# Patient Record
Sex: FEMALE | Race: WHITE
[De-identification: names, ages, dates, MRNs, and addresses within clinical notes are randomized per-mention and may not be internally consistent; named-entity substitution may affect disease eponyms.]

---

## 2019-11-25 ENCOUNTER — HOSPITAL ENCOUNTER (OUTPATIENT)
Dept: HOSPITAL 8 - LDOP | Age: 34
Discharge: HOME | End: 2019-11-25
Attending: OBSTETRICS & GYNECOLOGY
Payer: COMMERCIAL

## 2019-11-25 DIAGNOSIS — Z3A.28: ICD-10-CM

## 2019-11-25 DIAGNOSIS — R10.2: ICD-10-CM

## 2019-11-25 DIAGNOSIS — O26.893: Primary | ICD-10-CM

## 2019-11-25 LAB — MICROSCOPIC: (no result)

## 2019-11-25 PROCEDURE — G0463 HOSPITAL OUTPT CLINIC VISIT: HCPCS

## 2019-11-25 PROCEDURE — 99211 OFF/OP EST MAY X REQ PHY/QHP: CPT

## 2019-11-25 PROCEDURE — 59025 FETAL NON-STRESS TEST: CPT

## 2019-11-25 PROCEDURE — 87086 URINE CULTURE/COLONY COUNT: CPT

## 2019-11-25 PROCEDURE — 81003 URINALYSIS AUTO W/O SCOPE: CPT

## 2019-12-29 ENCOUNTER — HOSPITAL ENCOUNTER (OUTPATIENT)
Dept: HOSPITAL 8 - LDOP | Age: 34
Discharge: HOME | End: 2019-12-29
Attending: OBSTETRICS & GYNECOLOGY
Payer: COMMERCIAL

## 2019-12-29 VITALS — WEIGHT: 137.35 LBS | HEIGHT: 63 IN | BODY MASS INDEX: 24.34 KG/M2

## 2019-12-29 VITALS — DIASTOLIC BLOOD PRESSURE: 63 MMHG | SYSTOLIC BLOOD PRESSURE: 118 MMHG

## 2019-12-29 DIAGNOSIS — O09.513: ICD-10-CM

## 2019-12-29 DIAGNOSIS — Z3A.33: ICD-10-CM

## 2019-12-29 DIAGNOSIS — R10.9: ICD-10-CM

## 2019-12-29 DIAGNOSIS — O26.893: Primary | ICD-10-CM

## 2019-12-29 PROCEDURE — 81001 URINALYSIS AUTO W/SCOPE: CPT

## 2019-12-29 PROCEDURE — 87086 URINE CULTURE/COLONY COUNT: CPT

## 2019-12-29 PROCEDURE — 99211 OFF/OP EST MAY X REQ PHY/QHP: CPT

## 2019-12-29 PROCEDURE — 59025 FETAL NON-STRESS TEST: CPT

## 2019-12-29 PROCEDURE — G0463 HOSPITAL OUTPT CLINIC VISIT: HCPCS

## 2019-12-29 PROCEDURE — 76817 TRANSVAGINAL US OBSTETRIC: CPT

## 2019-12-30 LAB — MICROSCOPIC: (no result)

## 2020-11-18 ENCOUNTER — HOSPITAL ENCOUNTER (OUTPATIENT)
Dept: LAB | Facility: MEDICAL CENTER | Age: 35
End: 2020-11-18
Attending: ORTHOPAEDIC SURGERY
Payer: COMMERCIAL

## 2020-11-18 LAB
BASOPHILS # BLD AUTO: 0.5 % (ref 0–1.8)
BASOPHILS # BLD: 0.03 K/UL (ref 0–0.12)
CRP SERPL HS-MCNC: 0.04 MG/DL (ref 0–0.75)
EOSINOPHIL # BLD AUTO: 0.11 K/UL (ref 0–0.51)
EOSINOPHIL NFR BLD: 1.8 % (ref 0–6.9)
ERYTHROCYTE [DISTWIDTH] IN BLOOD BY AUTOMATED COUNT: 39.3 FL (ref 35.9–50)
HCT VFR BLD AUTO: 42.8 % (ref 37–47)
HGB BLD-MCNC: 15.2 G/DL (ref 12–16)
IMM GRANULOCYTES # BLD AUTO: 0.01 K/UL (ref 0–0.11)
IMM GRANULOCYTES NFR BLD AUTO: 0.2 % (ref 0–0.9)
LYMPHOCYTES # BLD AUTO: 2.75 K/UL (ref 1–4.8)
LYMPHOCYTES NFR BLD: 44.4 % (ref 22–41)
MCH RBC QN AUTO: 32.3 PG (ref 27–33)
MCHC RBC AUTO-ENTMCNC: 35.5 G/DL (ref 33.6–35)
MCV RBC AUTO: 90.9 FL (ref 81.4–97.8)
MONOCYTES # BLD AUTO: 0.44 K/UL (ref 0–0.85)
MONOCYTES NFR BLD AUTO: 7.1 % (ref 0–13.4)
NEUTROPHILS # BLD AUTO: 2.85 K/UL (ref 2–7.15)
NEUTROPHILS NFR BLD: 46 % (ref 44–72)
NRBC # BLD AUTO: 0 K/UL
NRBC BLD-RTO: 0 /100 WBC
PLATELET # BLD AUTO: 275 K/UL (ref 164–446)
PMV BLD AUTO: 9.5 FL (ref 9–12.9)
RBC # BLD AUTO: 4.71 M/UL (ref 4.2–5.4)
RHEUMATOID FACT SER IA-ACNC: <10 IU/ML (ref 0–14)
URATE SERPL-MCNC: 5.8 MG/DL (ref 1.9–8.2)
WBC # BLD AUTO: 6.2 K/UL (ref 4.8–10.8)

## 2020-11-18 PROCEDURE — 84550 ASSAY OF BLOOD/URIC ACID: CPT

## 2020-11-18 PROCEDURE — 86038 ANTINUCLEAR ANTIBODIES: CPT

## 2020-11-18 PROCEDURE — 86200 CCP ANTIBODY: CPT

## 2020-11-18 PROCEDURE — 36415 COLL VENOUS BLD VENIPUNCTURE: CPT

## 2020-11-18 PROCEDURE — 86140 C-REACTIVE PROTEIN: CPT

## 2020-11-18 PROCEDURE — 86431 RHEUMATOID FACTOR QUANT: CPT

## 2020-11-18 PROCEDURE — 85025 COMPLETE CBC W/AUTO DIFF WBC: CPT

## 2020-11-20 LAB
CCP IGG SERPL-ACNC: 4 UNITS (ref 0–19)
NUCLEAR IGG SER QL IA: NORMAL

## 2021-08-11 ENCOUNTER — TELEPHONE (OUTPATIENT)
Dept: SCHEDULING | Facility: IMAGING CENTER | Age: 36
End: 2021-08-11

## 2023-09-01 ENCOUNTER — HOSPITAL ENCOUNTER (EMERGENCY)
Facility: MEDICAL CENTER | Age: 38
End: 2023-09-01
Attending: EMERGENCY MEDICINE
Payer: COMMERCIAL

## 2023-09-01 ENCOUNTER — APPOINTMENT (OUTPATIENT)
Dept: RADIOLOGY | Facility: MEDICAL CENTER | Age: 38
End: 2023-09-01
Attending: EMERGENCY MEDICINE
Payer: COMMERCIAL

## 2023-09-01 VITALS
WEIGHT: 125.88 LBS | SYSTOLIC BLOOD PRESSURE: 106 MMHG | OXYGEN SATURATION: 97 % | HEART RATE: 66 BPM | BODY MASS INDEX: 22.3 KG/M2 | HEIGHT: 63 IN | RESPIRATION RATE: 16 BRPM | TEMPERATURE: 98 F | DIASTOLIC BLOOD PRESSURE: 69 MMHG

## 2023-09-01 DIAGNOSIS — G51.0 BELL'S PALSY: ICD-10-CM

## 2023-09-01 DIAGNOSIS — M79.2 RADICULAR PAIN IN RIGHT ARM: ICD-10-CM

## 2023-09-01 LAB
ALBUMIN SERPL BCP-MCNC: 4.7 G/DL (ref 3.2–4.9)
ALBUMIN/GLOB SERPL: 1.6 G/DL
ALP SERPL-CCNC: 60 U/L (ref 30–99)
ALT SERPL-CCNC: 13 U/L (ref 2–50)
ANION GAP SERPL CALC-SCNC: 14 MMOL/L (ref 7–16)
AST SERPL-CCNC: 16 U/L (ref 12–45)
BASOPHILS # BLD AUTO: 0.2 % (ref 0–1.8)
BASOPHILS # BLD: 0.01 K/UL (ref 0–0.12)
BILIRUB SERPL-MCNC: 0.4 MG/DL (ref 0.1–1.5)
BUN SERPL-MCNC: 10 MG/DL (ref 8–22)
CALCIUM ALBUM COR SERPL-MCNC: 8.6 MG/DL (ref 8.5–10.5)
CALCIUM SERPL-MCNC: 9.2 MG/DL (ref 8.4–10.2)
CHLORIDE SERPL-SCNC: 103 MMOL/L (ref 96–112)
CO2 SERPL-SCNC: 19 MMOL/L (ref 20–33)
CREAT SERPL-MCNC: 0.67 MG/DL (ref 0.5–1.4)
CRP SERPL HS-MCNC: <0.3 MG/DL (ref 0–0.75)
EKG IMPRESSION: NORMAL
EOSINOPHIL # BLD AUTO: 0.03 K/UL (ref 0–0.51)
EOSINOPHIL NFR BLD: 0.5 % (ref 0–6.9)
ERYTHROCYTE [DISTWIDTH] IN BLOOD BY AUTOMATED COUNT: 40.1 FL (ref 35.9–50)
ERYTHROCYTE [SEDIMENTATION RATE] IN BLOOD BY WESTERGREN METHOD: 3 MM/HOUR (ref 0–25)
GFR SERPLBLD CREATININE-BSD FMLA CKD-EPI: 113 ML/MIN/1.73 M 2
GLOBULIN SER CALC-MCNC: 2.9 G/DL (ref 1.9–3.5)
GLUCOSE SERPL-MCNC: 101 MG/DL (ref 65–99)
HCG SERPL QL: NEGATIVE
HCT VFR BLD AUTO: 41.9 % (ref 37–47)
HGB BLD-MCNC: 15 G/DL (ref 12–16)
IMM GRANULOCYTES # BLD AUTO: 0.02 K/UL (ref 0–0.11)
IMM GRANULOCYTES NFR BLD AUTO: 0.3 % (ref 0–0.9)
LYMPHOCYTES # BLD AUTO: 2.18 K/UL (ref 1–4.8)
LYMPHOCYTES NFR BLD: 33.4 % (ref 22–41)
MCH RBC QN AUTO: 32.4 PG (ref 27–33)
MCHC RBC AUTO-ENTMCNC: 35.8 G/DL (ref 32.2–35.5)
MCV RBC AUTO: 90.5 FL (ref 81.4–97.8)
MONOCYTES # BLD AUTO: 0.56 K/UL (ref 0–0.85)
MONOCYTES NFR BLD AUTO: 8.6 % (ref 0–13.4)
NEUTROPHILS # BLD AUTO: 3.72 K/UL (ref 1.82–7.42)
NEUTROPHILS NFR BLD: 57 % (ref 44–72)
NRBC # BLD AUTO: 0 K/UL
NRBC BLD-RTO: 0 /100 WBC (ref 0–0.2)
PLATELET # BLD AUTO: 292 K/UL (ref 164–446)
PMV BLD AUTO: 9.6 FL (ref 9–12.9)
POTASSIUM SERPL-SCNC: 3.4 MMOL/L (ref 3.6–5.5)
PROT SERPL-MCNC: 7.6 G/DL (ref 6–8.2)
RBC # BLD AUTO: 4.63 M/UL (ref 4.2–5.4)
SODIUM SERPL-SCNC: 136 MMOL/L (ref 135–145)
WBC # BLD AUTO: 6.5 K/UL (ref 4.8–10.8)

## 2023-09-01 PROCEDURE — 99285 EMERGENCY DEPT VISIT HI MDM: CPT

## 2023-09-01 PROCEDURE — 93005 ELECTROCARDIOGRAM TRACING: CPT | Performed by: EMERGENCY MEDICINE

## 2023-09-01 PROCEDURE — 85652 RBC SED RATE AUTOMATED: CPT

## 2023-09-01 PROCEDURE — 36415 COLL VENOUS BLD VENIPUNCTURE: CPT

## 2023-09-01 PROCEDURE — 86140 C-REACTIVE PROTEIN: CPT

## 2023-09-01 PROCEDURE — 84703 CHORIONIC GONADOTROPIN ASSAY: CPT

## 2023-09-01 PROCEDURE — 80053 COMPREHEN METABOLIC PANEL: CPT

## 2023-09-01 PROCEDURE — 70551 MRI BRAIN STEM W/O DYE: CPT

## 2023-09-01 PROCEDURE — 85025 COMPLETE CBC W/AUTO DIFF WBC: CPT

## 2023-09-01 RX ORDER — OMEGA-3 FATTY ACIDS/FISH OIL 300-1000MG
400 CAPSULE ORAL EVERY 6 HOURS PRN
COMMUNITY

## 2023-09-01 RX ORDER — PREDNISONE 10 MG/1
10 TABLET ORAL DAILY
Qty: 7 TABLET | Refills: 0 | Status: SHIPPED | OUTPATIENT
Start: 2023-09-01 | End: 2023-09-08

## 2023-09-01 NOTE — ED NOTES
Pharmacy Medication Reconciliation      ~Medication reconciliation updated and complete per patient at bedside   ~Allergies have been verified   ~No oral ABX within the last 30 days  ~[facility] home [facility] :  [facility]

## 2023-09-01 NOTE — ED TRIAGE NOTES
"Chief Complaint   Patient presents with    Paralysis     Right side   Started yesterday in her right Eye but has progressed into her mouth and arm   Pt reports that her right arm feels heavy and that her tongue feels numb. Pts tongue deviates to the right side when asked to stick it out and lack of movement when asked to smile     BP (!) 146/101   Pulse 77   Temp 36.9 °C (98.5 °F) (Temporal)   Resp 16   Ht 1.6 m (5' 3\")   Wt 57.1 kg (125 lb 14.1 oz)   SpO2 98%   BMI 22.30 kg/m²     Pt states that she about a week ago had pain in the right side of her neck.   "

## 2023-09-01 NOTE — ED PROVIDER NOTES
"ED Provider Note  CHIEF COMPLAINT  Chief Complaint   Patient presents with    Possible Stroke     Right side   Started yesterday in her right Eye but has progressed into her mouth and arm   Pt reports that her right arm feels heavy and that her tongue feels numb. Pts tongue deviates to the right side when asked to stick it out and lack of movement when asked to smile       HPI  Shweta Gallardo is a 37 y.o. female who presents for evaluation of muscle weakness to the side of the face, including forehead as well as right arm \"heaviness.\"  Patient notes the symptoms started around her right eye yesterday and she noted some blurriness when waking in the morning.  She felt irritation in the eye and throughout the day noted that her lid was lagging and, subsequently, she noted the right side of her forehead and mouth was not moving appropriately when she looked in the mirror.  She states this has progressed throughout the day but is still able to move the right side of her face to a degree.  She notes that she had a deep tissue massage of the right upper extremity and posterior shoulder region several days ago and was very sore for the next 3 days.  Her arm is been feeling heavy since but she still has been able to use it.  She notes some sensory changes to the right side of her mouth and tongue as well.  She has no medical history in terms of vascular pathology or cardiac issues.  There is family history of stroke and diabetes as well as hypertension and hyperlipidemia.  Patient has never smoked and only drinks occasionally.  Patient also notes incidentally that a few days ago she felt she got bitten by something on the right upper neck and for a few days she had a small bump which she thought was a lymph node.  She is unclear if this is related in any way.  EXTERNAL RECORDS REVIEWED  Reviewed last office visit in November 2022 for unspecified chest pain.  ROS  Constitutional: No fevers or chills  Skin: No " "rashes  HEENT: No sore throat, runny nose, double vision, or current blurry vision.  Recent blurry vision yesterday morning.  Right-sided facial muscle weakness.  Neck: No neck pain  Chest: No pain or rashes  Pulm: No shortness of breath, cough, wheezing, stridor, or pain with inspiration/expiration  Gastrointestinal: No nausea, vomiting, diarrhea, constipation, bloating, melena, hematochezia or abdominal pain.  Genitourinary: No dysuria or hematuria  Musculoskeletal: Upper back pain near the trapezius.  No swelling.  No focal weakness to muscle extremities but the right arm feels \"heavy.\"  Neurologic: Water and sensory changes as noted above, especially in the face and right upper extremity.. No confusion or disorientation.  Heme: No bleeding or bruising problems.   Immuno: No hx of recurrent infections        LIMITATION TO HISTORY   None   OUTSIDE HISTORIAN(S):  None        PAST FAM HISTORY  History reviewed. No pertinent family history.    PAST MEDICAL HISTORY   None    SOCIAL HISTORY  Social History     Tobacco Use    Smoking status: Never    Smokeless tobacco: Never   Vaping Use    Vaping Use: Some days    Substances: Nicotine   Substance and Sexual Activity    Alcohol use: Yes     Alcohol/week: 4.2 oz     Types: 7 Glasses of wine per week    Drug use: No    Sexual activity: Not on file       SURGICAL HISTORY   has a past surgical history that includes appendectomy laparoscopic (1/21/2015).    CURRENT MEDICATIONS  Home Medications       Reviewed by Reyna Gomez (Pharmacy Tech) on 09/01/23 at 1411  Med List Status: Complete     Medication Last Dose Status   Ibuprofen 200 MG Cap 8/31/2023 Active                     ALLERGIES  No Known Allergies    PHYSICAL EXAM  VITAL SIGNS: /69   Pulse 66   Temp 36.7 °C (98 °F)   Resp 16   Ht 1.6 m (5' 3\")   Wt 57.1 kg (125 lb 14.1 oz)   SpO2 97%   BMI 22.30 kg/m²    Gen: Alert in no apparent distress.  HEENT: No signs of trauma, Bilateral external ears " normal, Nose normal. Conjunctiva normal, Non-icteric.  Mild facial muscle weakness including right forehead.  Patient appears to be phonating and tolerating her own secretions.  PERRLA, EOMI.  Neck: No posterior neck lesions or papules, and no neck stiffness.  Excellent range of motion actively.  No specific tenderness.  Neck otherwise supple.  No JVD.  Lymphatic: No cervical lymphadenopathy noted.   Cardiovascular: Regular rate and rhythm, no murmurs.  Capillary refill less than 3 seconds to all extremities, 2+ distal pulses.  Thorax & Lungs: Normal breath sounds, No respiratory distress, No wheezing bilateral chest rise  Abdomen: Bowel sounds normal, Soft, No tenderness, No masses, No pulsatile masses. No Guarding or rebound  Skin: Warm, Dry, No erythema, No rash noted to exposed areas.   Back: No bony tenderness, No CVA tenderness.  Mild tenderness to the right upper back in the area of the trapezius diffusely.  Extremities: Intact distal pulses, No edema  Neurologic: Alert , no facial droop, grossly normal coordination and strength to all extremities.  Sensation intact to light touch to all extremities.  Finger-to-nose normal bilaterally.  There is mild muscle weakness to the right side of the face including the forehead.  There is slight lid lag to the right eye when blinking.  Psychiatric: Affect pleasant    INITIAL IMPRESSION  Patient arrives for evaluation of what appears to be primarily a Bell's palsy issue.  Patient has overlapping symptoms in terms of her right arm heaviness which is likely related to the vigorous deep muscle massage which left her sore for 3 days.  She does not have any discernible muscle weakness to her right upper extremity versus the left and is neurologically intact.  She is able to coordinate the upper extremity as well.  I suspect that the likely bug bite she had on the back of her neck is unrelated as well.  There is no evidence for tick and I do not suspect this is related to a  Lyme issue.  Regardless, the patient is having right-sided arm and facial symptoms and I feel she can be ruled out for emergent issues such as CVA with an MRI.  Given that her symptoms started well over 14 hours ago, I do not feel angiography will benefit her  unless there is evidence for a CVA or vasculitis on the MRI.  I will perform screening labs to include sed rate and CRP for that reason as well.  Patient will watch closely for any deterioration both in terms of her neurologic status and her hemodynamic status.  She is mildly hypertensive here which is likely situational, but could be related to CVA.  Patient states understanding of the plan.  LABS  Results for orders placed or performed during the hospital encounter of 09/01/23   CBC WITH DIFFERENTIAL   Result Value Ref Range    WBC 6.5 4.8 - 10.8 K/uL    RBC 4.63 4.20 - 5.40 M/uL    Hemoglobin 15.0 12.0 - 16.0 g/dL    Hematocrit 41.9 37.0 - 47.0 %    MCV 90.5 81.4 - 97.8 fL    MCH 32.4 27.0 - 33.0 pg    MCHC 35.8 (H) 32.2 - 35.5 g/dL    RDW 40.1 35.9 - 50.0 fL    Platelet Count 292 164 - 446 K/uL    MPV 9.6 9.0 - 12.9 fL    Neutrophils-Polys 57.00 44.00 - 72.00 %    Lymphocytes 33.40 22.00 - 41.00 %    Monocytes 8.60 0.00 - 13.40 %    Eosinophils 0.50 0.00 - 6.90 %    Basophils 0.20 0.00 - 1.80 %    Immature Granulocytes 0.30 0.00 - 0.90 %    Nucleated RBC 0.00 0.00 - 0.20 /100 WBC    Neutrophils (Absolute) 3.72 1.82 - 7.42 K/uL    Lymphs (Absolute) 2.18 1.00 - 4.80 K/uL    Monos (Absolute) 0.56 0.00 - 0.85 K/uL    Eos (Absolute) 0.03 0.00 - 0.51 K/uL    Baso (Absolute) 0.01 0.00 - 0.12 K/uL    Immature Granulocytes (abs) 0.02 0.00 - 0.11 K/uL    NRBC (Absolute) 0.00 K/uL   COMP METABOLIC PANEL   Result Value Ref Range    Sodium 136 135 - 145 mmol/L    Potassium 3.4 (L) 3.6 - 5.5 mmol/L    Chloride 103 96 - 112 mmol/L    Co2 19 (L) 20 - 33 mmol/L    Anion Gap 14.0 7.0 - 16.0    Glucose 101 (H) 65 - 99 mg/dL    Bun 10 8 - 22 mg/dL     Creatinine 0.67 0.50 - 1.40 mg/dL    Calcium 9.2 8.4 - 10.2 mg/dL    Correct Calcium 8.6 8.5 - 10.5 mg/dL    AST(SGOT) 16 12 - 45 U/L    ALT(SGPT) 13 2 - 50 U/L    Alkaline Phosphatase 60 30 - 99 U/L    Total Bilirubin 0.4 0.1 - 1.5 mg/dL    Albumin 4.7 3.2 - 4.9 g/dL    Total Protein 7.6 6.0 - 8.2 g/dL    Globulin 2.9 1.9 - 3.5 g/dL    A-G Ratio 1.6 g/dL   Sed Rate   Result Value Ref Range    Sed Rate Westergren 3 0 - 25 mm/hour   CRP QUANTITIVE (NON-CARDIAC)   Result Value Ref Range    Stat C-Reactive Protein <0.30 0.00 - 0.75 mg/dL   HCG QUAL SERUM   Result Value Ref Range    Beta-Hcg Qualitative Serum Negative Negative   ESTIMATED GFR   Result Value Ref Range    GFR (CKD-EPI) 113 >60 mL/min/1.73 m 2   EKG (NOW)   Result Value Ref Range    Report       [facility] Emergency Dept.    Test Date:  2023  Pt Name:    JEFFREY LACY            Department: [facility]  MRN:        [id number]                      [facility]  Gender:     Female                       Technician: [id number]  :        [id number]                   Requested By:PEPE KHAN  Order #:    [id number]                    Reading MD:    Measurements  Intervals                                Axis  Rate:       69                           P:          61  DC:         139                          QRS:        51  QRSD:       92                           T:          25  QT:         424  QTc:        455    Interpretive Statements  Sinus rhythm  No previous ECG available for comparison       I have independently interpreted this EKG  Sinus rhythm rate of 69, intervals appear to be within normal limits, there are no ST or T wave changes to suggest ischemia, there is no ectopy.  There is no old EKG to compare.  RADIOLOGY  MR-BRAIN-W/O   Final Result      1.  No acute abnormality.   2.  Unremarkable pre and postcontrast MR examination of the brain except for a focal nonspecific T2 hyperintensity in the left parietal white  "matter likely representing nonspecific foci of gliosis.        I have independently interpreted the diagnostic imaging associated with this visit and am waiting the final reading from the radiologist.   My preliminary interpretation is a follows: Single view chest x-ray: There are no pulmonary opacities to suggest infiltrates or effusions, cardiomediastinal silhouette appears to be within normal limits.  There are no bony abnormalities.        COURSE & MEDICAL DECISION MAKING  Pertinent Labs & Imaging studies reviewed. (See chart for details)  4:25 PM  Reevaluated patient at bedside.  She is calm, conversant, has had no interval worsening of her symptoms.  She states understanding of the incidental finding on her MRI of the gliosis in a small area as well as the abnormal findings on the lab exam which are not significant.  Given the entirety of the exam, and although the sed rate is not back, I feel this is very likely to be two separate issues.  First issue is Bell's palsy which has a fairly typical presentation but does have some sensory  changes superimposed.  She is not having any significant difficulty speaking or swallowing however.  Her arm \"heaviness\" is likely from the deep tissue massage that she had.  posterior trapezius region right she apparently had a deep tissue massage to work out a kink in the but she was extremely sore for 3 days after the massage.  I do not feel she will benefit from neck or head angiography at this point and I do not feel neurology consultation or inpatient observation is necessary.  Patient will be treated empirically for Bell's palsy with prednisone.  There is no evidence for a viral illness superimposed so antivirals will not be given.  Patient will be encouraged to patch her eye at night so that she does not develop corneal ulceration from lack of lid closure.  She will follow-up with her primary care physician or return if her symptoms worsen or change in any way.      I " have discussed management of the patient with the following physicians and JESSICA's:  none    Escalation of care considered, and ultimately not performed:acute inpatient care management, however at this time, the patient is most appropriate for outpatient management, neurology consultation    Barriers to care at this time, including but not limited to: None.     Decision tools and Rx drugs considered including, but not limited to : None    Discussion of management with other QHP or appropriate source(s): none    The patient will not drink alcohol nor drive with prescribed medications. The patient will return for worsening symptoms and is stable at the time of discharge. The patient verbalizes understanding and will comply.    FINAL IMPRESSION  1. Bell's palsy    2. Radicular pain in right arm        Electronically signed by: Jayant Tabor M.D., 9/1/2023 1:15 PM

## 2023-09-02 NOTE — ED NOTES
Discharge instructions given and discussed. RX for prednisone given and pt educated. Pt educated to come back to ER for new or worsening symptoms and follow up with PCP as instructed. Pt verbalized understanding. VSS. Pt  Discharged in stable condition.

## 2023-10-14 ENCOUNTER — HOSPITAL ENCOUNTER (OUTPATIENT)
Dept: RADIOLOGY | Facility: MEDICAL CENTER | Age: 38
End: 2023-10-14
Attending: NURSE PRACTITIONER
Payer: COMMERCIAL

## 2023-10-14 DIAGNOSIS — R93.0 NONSPECIFIC ABNORMAL FINDINGS ON IMAGING EXAMINATION OF SKULL AND HEAD: ICD-10-CM

## 2023-10-14 PROCEDURE — 700117 HCHG RX CONTRAST REV CODE 255: Performed by: NURSE PRACTITIONER

## 2023-10-14 PROCEDURE — A9579 GAD-BASE MR CONTRAST NOS,1ML: HCPCS | Performed by: NURSE PRACTITIONER

## 2023-10-14 PROCEDURE — 70553 MRI BRAIN STEM W/O & W/DYE: CPT

## 2023-10-14 RX ADMIN — GADOTERIDOL 10 ML: 279.3 INJECTION, SOLUTION INTRAVENOUS at 18:44

## 2023-10-26 ENCOUNTER — APPOINTMENT (OUTPATIENT)
Dept: RADIOLOGY | Facility: MEDICAL CENTER | Age: 38
End: 2023-10-26
Attending: OBSTETRICS & GYNECOLOGY
Payer: COMMERCIAL

## 2023-10-26 DIAGNOSIS — Z12.31 VISIT FOR SCREENING MAMMOGRAM: ICD-10-CM

## 2023-10-26 PROCEDURE — 77063 BREAST TOMOSYNTHESIS BI: CPT

## 2023-11-01 ENCOUNTER — HOSPITAL ENCOUNTER (OUTPATIENT)
Dept: RADIOLOGY | Facility: MEDICAL CENTER | Age: 38
End: 2023-11-01
Attending: OBSTETRICS & GYNECOLOGY
Payer: COMMERCIAL

## 2023-11-01 DIAGNOSIS — R92.8 ABNORMAL MAMMOGRAM: ICD-10-CM

## 2023-11-01 PROCEDURE — G0279 TOMOSYNTHESIS, MAMMO: HCPCS

## 2023-11-01 PROCEDURE — 76642 ULTRASOUND BREAST LIMITED: CPT | Mod: LT

## 2024-10-15 ENCOUNTER — TELEPHONE (OUTPATIENT)
Dept: HEALTH INFORMATION MANAGEMENT | Facility: OTHER | Age: 39
End: 2024-10-15
Payer: COMMERCIAL

## 2024-11-04 ENCOUNTER — APPOINTMENT (OUTPATIENT)
Dept: RADIOLOGY | Facility: MEDICAL CENTER | Age: 39
End: 2024-11-04
Attending: FAMILY MEDICINE
Payer: COMMERCIAL

## 2024-11-04 DIAGNOSIS — Z12.31 VISIT FOR SCREENING MAMMOGRAM: ICD-10-CM

## 2024-11-04 PROCEDURE — 77067 SCR MAMMO BI INCL CAD: CPT

## 2025-01-21 ENCOUNTER — RESEARCH ENCOUNTER (OUTPATIENT)
Dept: RESEARCH | Facility: MEDICAL CENTER | Age: 40
End: 2025-01-21
Payer: COMMERCIAL

## 2025-04-27 ENCOUNTER — APPOINTMENT (OUTPATIENT)
Dept: RADIOLOGY | Facility: MEDICAL CENTER | Age: 40
End: 2025-04-27
Attending: STUDENT IN AN ORGANIZED HEALTH CARE EDUCATION/TRAINING PROGRAM

## 2025-07-07 ENCOUNTER — OFFICE VISIT (OUTPATIENT)
Dept: URGENT CARE | Facility: CLINIC | Age: 40
End: 2025-07-07
Payer: COMMERCIAL

## 2025-07-07 VITALS
SYSTOLIC BLOOD PRESSURE: 110 MMHG | RESPIRATION RATE: 16 BRPM | HEART RATE: 74 BPM | DIASTOLIC BLOOD PRESSURE: 80 MMHG | HEIGHT: 62 IN | TEMPERATURE: 99.1 F | BODY MASS INDEX: 22.82 KG/M2 | OXYGEN SATURATION: 98 % | WEIGHT: 124 LBS

## 2025-07-07 DIAGNOSIS — R07.81 RIB PAIN ON RIGHT SIDE: ICD-10-CM

## 2025-07-07 DIAGNOSIS — R07.89 COSTOCHONDRAL PAIN: Primary | ICD-10-CM

## 2025-07-07 PROCEDURE — 3074F SYST BP LT 130 MM HG: CPT | Performed by: NURSE PRACTITIONER

## 2025-07-07 PROCEDURE — 99203 OFFICE O/P NEW LOW 30 MIN: CPT | Performed by: NURSE PRACTITIONER

## 2025-07-07 PROCEDURE — 3079F DIAST BP 80-89 MM HG: CPT | Performed by: NURSE PRACTITIONER

## 2025-07-07 ASSESSMENT — FIBROSIS 4 INDEX: FIB4 SCORE: 0.56

## 2025-07-08 ASSESSMENT — ENCOUNTER SYMPTOMS: BACK PAIN: 1

## 2025-07-08 NOTE — PROGRESS NOTES
"Subjective     Shweta Gallardo is a 39 y.o. female who presents with Back Pain (X 4 days, patient explains did get back from camping also, when twisting or using rt arm is painful)            Back Pain  This is a new problem. Episode onset: pt reports new onset of right sided rib/back pain that started 4 days ago. no injury, no trauma. no recent cough. she has been camping the last few days and admits she has been moving a lot of things in and out of the camper. (She also noticed a rash to her back and unsure of significance) She has tried analgesics for the symptoms. The treatment provided no relief.       Review of Systems   Musculoskeletal:  Positive for back pain.   All other systems reviewed and are negative.       Past Medical History[1] Past Surgical History[2]   Social History     Socioeconomic History    Marital status:      Spouse name: Not on file    Number of children: Not on file    Years of education: Not on file    Highest education level: Not on file   Occupational History    Not on file   Tobacco Use    Smoking status: Never    Smokeless tobacco: Never   Vaping Use    Vaping status: Not on file   Substance and Sexual Activity    Alcohol use: Yes     Alcohol/week: 4.2 oz     Types: 7 Glasses of wine per week    Drug use: No    Sexual activity: Not on file   Other Topics Concern    Not on file   Social History Narrative    Not on file     Social Drivers of Health     Financial Resource Strain: Not on file   Food Insecurity: Not on file   Transportation Needs: Not on file   Physical Activity: Not on file   Stress: Not on file   Social Connections: Not on file   Intimate Partner Violence: Not on file   Housing Stability: Not on file           Objective     /80 (BP Location: Left arm, Patient Position: Sitting, BP Cuff Size: Large adult)   Pulse 74   Temp 37.3 °C (99.1 °F)   Resp 16   Ht 1.575 m (5' 2\")   Wt 56.2 kg (124 lb)   SpO2 98%   BMI 22.68 kg/m²      Physical " Exam  Vitals and nursing note reviewed.   Constitutional:       Appearance: Normal appearance. She is normal weight.   HENT:      Head: Normocephalic and atraumatic.      Nose: Nose normal.      Mouth/Throat:      Mouth: Mucous membranes are moist.      Pharynx: Oropharynx is clear.   Eyes:      Extraocular Movements: Extraocular movements intact.      Pupils: Pupils are equal, round, and reactive to light.   Cardiovascular:      Rate and Rhythm: Normal rate and regular rhythm.   Pulmonary:      Effort: Pulmonary effort is normal.      Breath sounds: Normal breath sounds.   Musculoskeletal:         General: Normal range of motion.      Cervical back: Normal range of motion.   Skin:     General: Skin is warm and dry.      Capillary Refill: Capillary refill takes less than 2 seconds.          Neurological:      General: No focal deficit present.      Mental Status: She is alert and oriented to person, place, and time. Mental status is at baseline.   Psychiatric:         Mood and Affect: Mood normal.         Speech: Speech normal.         Thought Content: Thought content normal.         Judgment: Judgment normal.                                  Assessment & Plan  Costochondral pain  The ASCVD Risk score (Orly AMIN, et al., 2019) failed to calculate for the following reasons:    Cannot find a previous HDL lab    Cannot find a previous total cholesterol lab           Rib pain on right side  The ASCVD Risk score (Orly AMIN, et al., 2019) failed to calculate for the following reasons:    Cannot find a previous HDL lab    Cannot find a previous total cholesterol lab         discussed back/rib strain  Encouraged her to use OTC ibuprofen 600 mg TID for 5 days to help with pain  She can also use ice compresses for pain  No heavy lifting  Let pain be her guide with activity  Supportive care, differential diagnoses, and indications for immediate follow-up discussed with patient.    Pathogenesis of diagnosis discussed including  typical length and natural progression.    Instructed to return to  or nearest emergency department if symptoms fail to improve, for any change in condition, further concerns, or new concerning symptoms.  Patient states understanding of the plan of care and discharge instructions.                      [1] History reviewed. No pertinent past medical history.  [2]   Past Surgical History:  Procedure Laterality Date    APPENDECTOMY LAPAROSCOPIC  1/21/2015    Performed by Erickson Stover M.D. at [facility]